# Patient Record
Sex: MALE | Race: WHITE | HISPANIC OR LATINO | URBAN - METROPOLITAN AREA
[De-identification: names, ages, dates, MRNs, and addresses within clinical notes are randomized per-mention and may not be internally consistent; named-entity substitution may affect disease eponyms.]

---

## 2024-08-08 ENCOUNTER — EMERGENCY (EMERGENCY)
Facility: HOSPITAL | Age: 44
LOS: 1 days | Discharge: HOME | End: 2024-08-08
Attending: EMERGENCY MEDICINE | Admitting: EMERGENCY MEDICINE
Payer: SELF-PAY

## 2024-08-08 VITALS
RESPIRATION RATE: 18 BRPM | WEIGHT: 149.91 LBS | HEIGHT: 61 IN | SYSTOLIC BLOOD PRESSURE: 143 MMHG | OXYGEN SATURATION: 96 % | HEART RATE: 95 BPM | DIASTOLIC BLOOD PRESSURE: 94 MMHG | TEMPERATURE: 99 F

## 2024-08-08 PROCEDURE — 99283 EMERGENCY DEPT VISIT LOW MDM: CPT

## 2024-08-08 NOTE — ED PROVIDER NOTE - PATIENT PORTAL LINK FT
You can access the FollowMyHealth Patient Portal offered by [facility] by registering at the following website: [email or web address]. By joining [facility]’s FollowMyHealth portal, you will also be able to view your health information using other applications (apps) compatible with our system.

## 2024-08-08 NOTE — ED ADULT NURSE NOTE - CARDIO ASSESSMENT
Contacted patient and advised per EB patient should continue with the current 1/2 tablet of Lisinorpril/HCTZ because blood pressure readings at home have been under control. Patient advised to continue to monitor blood pressures at home and bring readings with to appt next week with Dr Upton.   Patient does verbalize understanding.    ---

## 2024-08-08 NOTE — ED PROVIDER NOTE - PHYSICAL EXAMINATION
Gen: NAD, awake and alert, well kempt  Head: NC/AT  Neck: trachea midline  Resp:  No distress  Ext: no deformities  Neuro:  A&O appears non focal, ambulatory independently.   Skin:  Warm and dry as visualized  Psych:  Normal affect and mood

## 2024-08-08 NOTE — ED PROVIDER NOTE - OBJECTIVE STATEMENT
43-year-old male presents to the emergency department for alcohol intoxication, public display.  Brought from a station in [place] according to triage.  Patient admits to alcohol intake today.  Denies drugs.  No trauma.  No complaints. Patient denies medical problems.  States he is homeless and lives in the streets of [place]

## 2024-08-08 NOTE — ED PROVIDER NOTE - CLINICAL SUMMARY MEDICAL DECISION MAKING FREE TEXT BOX
43-year-old male presents to the emergency department for alcohol intoxication, public display.  Brought from a station in [place] according to triage.  Patient admits to alcohol intake today.  Denies drugs.  No trauma.  No complaints. Patient denies medical problems.  States he is homeless and lives in the streets of [place].   Pt has steady gait. Will observe and plan for discharge.

## 2024-08-08 NOTE — ED ADULT NURSE NOTE - OBJECTIVE STATEMENT
[place], pt states that he is homeless. Currently awake, alert and oriented x 4. follows commands, able to ambulate. No nausea, vomiting, dizziness or SOB.

## 2024-08-08 NOTE — ED PROVIDER NOTE - NSFOLLOWUPINSTRUCTIONS_ED_ALL_ED_FT
Trastorno por consumo de bebidas alcohólicas  Alcohol Use Disorder  El trastorno por consumo de bebidas alcohólicas es colt afección en la que el consumo de alcohol altera la david cotidiana. Las personas con esta afección consumen bebidas alcohólicas en exceso y no pueden controlar coon consumo.    El trastorno por consumo de bebidas alcohólicas puede causar problemas graves en la dot física. Puede afectar al cerebro, al corazón y a otros órganos internos. Zenaida trastorno puede aumentar el riesgo de padecer ciertos tipos de cáncer y causar problemas de dot mental, chuy depresión o ansiedad.    ¿Cuáles son las causas?  Esta afección se debe al consumo excesivo de alcohol con el transcurso del tiempo. Algunas personas que sufren esta afección beben para enfrentarse a situaciones negativas de la david o escapar de ellas. Otros beben para aliviar los síntomas de dolor físico o los síntomas de colt enfermedad mental.    ¿Qué incrementa el riesgo?  Es más probable que sufra esta afección si:  Tiene antecedentes familiares de trastorno por consumo de bebidas alcohólicas.  Coon cultura fomenta el consumo de bebidas alcohólicas hasta el punto de emborracharse (intoxicación alcohólica).  Tuvo un trastorno emocional o de conducta en la infancia.  Ha sufrido abusos.  Es adolescente y:  Tiene un desempeño deficiente en la escuela.  Recibe poca supervisión o guía.  Actúa de forma impulsiva y le gusta rishi riesgos.  ¿Cuáles son los signos o síntomas?  Los síntomas de esta afección incluyen:  Beber más de lo que desea.  Intentar beber menos en varias ocasiones, sin éxito.  Pasar mucho tiempo pensando en el alcohol, intentando conseguir alcohol, bebiendo alcohol o recuperándose de heydi bebido alcohol.  Continuar bebiendo incluso cuando esto le causa problemas graves en coon david cotidiana.  Beber cuando es peligroso hacerlo, por ejemplo, antes de conducir.  Necesitar cada vez más alcohol para obtener el mismo efecto que busca (generar tolerancia).  Tener síntomas de abstinencia al dejar de beber. Entre los síntomas de abstinencia se incluyen los siguientes:  Dificultad para dormir, que produce cansancio (fatiga).  Cambios en el estado de ánimo, con depresión y ansiedad.  Síntomas físicos, chuy frecuencia cardíaca acelerada, respiración rápida, presión arterial elevada (hipertensión), fiebre, sudoración fría o náuseas.  Convulsiones.  Confusión grave.  Slim o sentir cosas que no existen (alucinaciones).  Temblores que no se pueden controlar.  ¿Cómo se diagnostica?  Esta afección se diagnostica mediante colt evaluación. Para comenzar, el médico puede hacerle cassy o cuatro preguntas sobre coon consumo de alcohol o entregarle colt prueba sencilla que deberá realizar. [place] ayudará a obtener información lenny sobre usted.    Pueden hacerle un examen físico o análisis. Pueden derivarlo a un terapeuta especializado en abuso de sustancias.    ¿Cómo se trata?  Two people talking with a counselor.  Con la educación, algunas personas con trastorno por consumo de bebidas alcohólicas pueden reducir coon consumo. Muchas personas con zenaida trastorno no son capaces de modificar coon comportamiento por coon cuenta y necesitan la ayuda de un tratamiento por parte de especialistas en abuso de sustancias. Los tratamientos pueden incluir lo siguiente:  Desintoxicación. La desintoxicación consiste en dejar de beber, con la supervisión y las instrucciones de los médicos. El médico puede recetarle medicamentos en la primera semana para ayudar a disminuir los síntomas de la abstinencia. La abstinencia puede ser peligrosa y potencialmente mortal. La desintoxicación puede realizarse en el hogar, en un ámbito extrahospitalario, en un centro de atención primaria, en un hospital o en un centro de tratamiento para abuso de sustancias.  Asesoramiento psicológico. Puede incluir entrevistas motivacionales (EM), terapia familiar o terapia cognitivo-conductual (TCC). Un terapeuta puede abordar cómo cambiar coon comportamiento de consumo de bebidas alcohólicas y cómo mantener los cambios. La terapia tiene chuy objetivos:  Identificar jenelle motivaciones positivas para cambiar.  Identificar y evitar aquello que lo conduzca a beber alcohol.  Enseñarle a planificar coon cambio de comportamiento.  Proponer sistemas de apoyo que puedan ayudarlo a mantener el cambio.  Medicamentos. Los medicamentos pueden ayudar a tratar zenaida trastorno de la siguiente manera:  Disminuyen el deseo intenso de consumir.  Reducen el sentimiento positivo que experimenta al beber alcohol.  Causan colt reacción física desagradable cuando alyssa alcohol (terapia de aversión).  Grupos de apoyo chuy Alcohólicos Anónimos (AA). Estos grupos son dirigidos por personas que eduardo superado coon problema con la bebida. Estos grupos brindan apoyo emocional, consejos y orientación.  Algunas personas con esta afección se benefician del tratamiento combinado que se ofrece en algunos centros de tratamiento especializados en el abuso de sustancias.    Siga estas instrucciones en coon casa:  A sign telling a person not to drink beer, wine, or hard liquor.   Medicamentos    Use los medicamentos de venta marianna y los recetados solamente chuy se lo haya indicado el médico.  Consulte antes de empezar a rishi cualquier medicamento, hierbas o suplementos nuevos.  Instrucciones generales    Pídales a jenelle amistades y familiares que apoyen coon decisión de mantenerse sobrio.  Evite los lugares en los que se sirve alcohol.  Aspen un plan para lidiar con las situaciones tentadoras.  Asista a grupos de apoyo con regularidad.  Practique pasatiempos o actividades que le gusten.  No conduzca después de beber alcohol.  ¿Cómo se previene?  No spencer alcohol si el médico se lo prohíbe.  Si alyssa alcohol:  Limite la cantidad que alyssa a lo siguiente:  De 0 a 1 medida por día para las mujeres que no están embarazadas.  De 0 a 2 medidas por día para los hombres.  Sepa cuánta cantidad de alcohol hay en las bebidas que daniel. En los [place], colt medida equivale a colt botella de cerveza de 12 oz (355 ml), un vaso de vino de 5 oz (148 ml) o un vaso de colt bebida alcohólica de adarsh graduación de 1½ oz (44 ml).  Si tiene colt afección de dot mental, busque tratamiento.  Lleve un estilo de david saludable, que puede incorporar lo siguiente:  Meditación o respiración profunda.  Realizar actividad física.  Pasar tiempo en contacto con la naturaleza.  Escuchar música.  Charlar con algún familiar o amigo de confianza.  Si eres adolescente:  No bebas alcohol. Whit las reuniones en las que puedas tener la tentación de beber alcohol.  No tengas miedo de negarte si alguien te ofrece alcohol. Habla sin reservas acerca de por qué no quieres beber alcohol. Sé un ejemplo positivo para los demás al no consumir alcohol.  Construye relaciones con amigos que no beban.  Dónde obtener más información  Substance Abuse and Mental Health Services Administration (Administración de Servicios por Abuso de Sustancias y Dot Mental): samhsa.gov  Alcohólicos Anónimos: aa.org  Comuníquese con un médico si:  No puede rishi los medicamentos chuy se lo eduardo indicado.  Jenelle síntomas empeoran o tiene síntomas de abstinencia cuando jovani de beber.  Vuelve a comenzar a beber (recaída) y los síntomas empeoran.  Solicite ayuda de inmediato si:  Tiene pensamientos acerca de lastimarse o lastimar a otras personas.  Busque ayuda de inmediato si alguna vez siente que puede hacerse daño a usted mismo o a otros, o tiene pensamientos de poner fin a coon david. Diríjase al centro de urgencias más cercano o:  Llame al 911.  Llame a National Suicide Prevention Lifeline (Línea [employer]ica Nacional para la Prevención del Suicidio) al 1-152.756.3279 o al 988. Está disponible las 24 horas del día.  Envíe un mensaje de texto a la línea para casos de crisis al [id number].  Resumen  El trastorno por consumo de bebidas alcohólicas es colt afección en la que el consumo de alcohol altera la david cotidiana. Las personas con esta afección consumen bebidas alcohólicas en exceso y no pueden controlar coon consumo.  El tratamiento puede incluir desintoxicación, asesoramiento psicológico, medicamentos y grupos de apoyo.  Pida ayuda a amigos y familiares. Evite situaciones en las que se sirva alcohol.  Busque ayuda de inmediato si tiene pensamientos acerca de lastimarse a usted mismo o a otras personas.  Esta información no tiene chuy fin reemplazar el consejo del médico. Asegúrese de hacerle al médico cualquier pregunta que tenga.

## 2024-08-08 NOTE — ED PROVIDER NOTE - PROGRESS NOTE DETAILS
Patient is clinically sober at this time with normal speech and normal gait.  We will arrange discharge home.  Patient is not interested in social work resources for alcohol cessation

## 2024-08-09 VITALS
OXYGEN SATURATION: 99 % | SYSTOLIC BLOOD PRESSURE: 136 MMHG | HEART RATE: 78 BPM | DIASTOLIC BLOOD PRESSURE: 60 MMHG | RESPIRATION RATE: 18 BRPM

## 2024-08-09 PROCEDURE — 99285 EMERGENCY DEPT VISIT HI MDM: CPT

## 2024-08-09 NOTE — ED ADULT NURSE REASSESSMENT NOTE - NS ED NURSE REASSESS COMMENT FT1
pt. walked to the bathroom with steady gait, want to sleep little and go home, VSS, Nursing care continue.